# Patient Record
Sex: MALE | Race: WHITE
[De-identification: names, ages, dates, MRNs, and addresses within clinical notes are randomized per-mention and may not be internally consistent; named-entity substitution may affect disease eponyms.]

---

## 2018-02-05 ENCOUNTER — HOSPITAL ENCOUNTER (EMERGENCY)
Dept: HOSPITAL 17 - NEPD | Age: 33
Discharge: HOME | End: 2018-02-05
Payer: COMMERCIAL

## 2018-02-05 VITALS
TEMPERATURE: 98 F | SYSTOLIC BLOOD PRESSURE: 136 MMHG | HEART RATE: 56 BPM | RESPIRATION RATE: 16 BRPM | OXYGEN SATURATION: 99 % | DIASTOLIC BLOOD PRESSURE: 88 MMHG

## 2018-02-05 DIAGNOSIS — S81.852D: ICD-10-CM

## 2018-02-05 DIAGNOSIS — L02.416: Primary | ICD-10-CM

## 2018-02-05 DIAGNOSIS — L03.116: ICD-10-CM
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
## 2018-02-05 LAB
BASOPHILS # BLD AUTO: 0 TH/MM3 (ref 0–0.2)
BASOPHILS NFR BLD: 0.6 % (ref 0–2)
BUN SERPL-MCNC: 13 MG/DL (ref 7–18)
CALCIUM SERPL-MCNC: 9.7 MG/DL (ref 8.5–10.1)
CHLORIDE SERPL-SCNC: 106 MEQ/L (ref 98–107)
CREAT SERPL-MCNC: 0.94 MG/DL (ref 0.6–1.3)
EOSINOPHIL # BLD: 0.2 TH/MM3 (ref 0–0.4)
EOSINOPHIL NFR BLD: 2.5 % (ref 0–4)
ERYTHROCYTE [DISTWIDTH] IN BLOOD BY AUTOMATED COUNT: 13.2 % (ref 11.6–17.2)
GFR SERPLBLD BASED ON 1.73 SQ M-ARVRAT: 92 ML/MIN (ref 89–?)
GLUCOSE SERPL-MCNC: 88 MG/DL (ref 74–106)
HCO3 BLD-SCNC: 24.1 MEQ/L (ref 21–32)
HCT VFR BLD CALC: 40.3 % (ref 39–51)
HGB BLD-MCNC: 14.1 GM/DL (ref 13–17)
LYMPHOCYTES # BLD AUTO: 2.9 TH/MM3 (ref 1–4.8)
LYMPHOCYTES NFR BLD AUTO: 38.6 % (ref 9–44)
MCH RBC QN AUTO: 31.2 PG (ref 27–34)
MCHC RBC AUTO-ENTMCNC: 35 % (ref 32–36)
MCV RBC AUTO: 89 FL (ref 80–100)
MONOCYTE #: 0.6 TH/MM3 (ref 0–0.9)
MONOCYTES NFR BLD: 7.9 % (ref 0–8)
NEUTROPHILS # BLD AUTO: 3.7 TH/MM3 (ref 1.8–7.7)
NEUTROPHILS NFR BLD AUTO: 50.4 % (ref 16–70)
PLATELET # BLD: 270 TH/MM3 (ref 150–450)
PMV BLD AUTO: 8.5 FL (ref 7–11)
RBC # BLD AUTO: 4.53 MIL/MM3 (ref 4.5–5.9)
SODIUM SERPL-SCNC: 138 MEQ/L (ref 136–145)
WBC # BLD AUTO: 7.4 TH/MM3 (ref 4–11)

## 2018-02-05 PROCEDURE — 80048 BASIC METABOLIC PNL TOTAL CA: CPT

## 2018-02-05 PROCEDURE — 87205 SMEAR GRAM STAIN: CPT

## 2018-02-05 PROCEDURE — 85652 RBC SED RATE AUTOMATED: CPT

## 2018-02-05 PROCEDURE — 99284 EMERGENCY DEPT VISIT MOD MDM: CPT

## 2018-02-05 PROCEDURE — 85025 COMPLETE CBC W/AUTO DIFF WBC: CPT

## 2018-02-05 PROCEDURE — 73590 X-RAY EXAM OF LOWER LEG: CPT

## 2018-02-05 PROCEDURE — 87070 CULTURE OTHR SPECIMN AEROBIC: CPT

## 2018-02-05 NOTE — RADRPT
EXAM DATE/TIME:  02/05/2018 19:40 

 

[facility] COMPARISON:     

No previous studies available for comparison.

 

                     

INDICATIONS :     

Left tibia cat bite.

                     

 

MEDICAL HISTORY :     

None.          

 

SURGICAL HISTORY :     

None.   

 

ENCOUNTER:     

Initial                                        

 

ACUITY:     

3 weeks      

 

PAIN SCORE:     

3/10

 

LOCATION:     

Left middle lateral tibia.

 

FINDINGS:     

Two view examination of the left tibia demonstrates no evidence of fracture or dislocation.  Bony min
eralization is normal.  The soft tissue structures are intact.

 

CONCLUSION:     

Normal examination for a patient of this age.  

 

 

 

 Denilson Harrell MD on February 05, 2018 at 19:50           

Board Certified Radiologist.

 This report was verified electronically.

## 2018-02-05 NOTE — PD
HPI


Chief Complaint:  Bite or Sting


Time Seen by Provider:  19:07


Travel History


International Travel<30 days:  No


Contact w/Intl Traveler<30days:  No


Traveled to known affect area:  No





History of Present Illness


HPI


Patient is a 33-year-old male presenting to emergency department for evaluation 

of a cat bite.  Patient injury occurred on 1/18/18.  He went to his primary 

doctor was prescribed 10 days of doxycycline and clindamycin which she 

completed.  He reports that he's had continued oozing from the site to his left 

shin.  He reports it is tender to touch and rates his pain a 3 out of 10.  The 

pain is exacerbated with touch.  It is alleviated at rest.  Symptom onset was 

gradual.  He denies any fever, chills, nausea, vomiting, or warmth or 

increasing redness.  He was advised by his primary doctor to come to the 

emergency department to be evaluated if it was not healed.  Patient has been 

using peroxide to clean the area.  He was bitten by his own cat which is up-to-

date with immunizations.





PFSH


Past Medical History


Cancer:  No


High Cholesterol:  Yes


Diabetes:  No


Diminished Hearing:  No


Gastrointestinal Disorders:  Yes (asymptomatic gallstone, suspected peptic 

ulcer disease)


Hepatitis:  No


Hiatal Hernia:  No


Immunizations Current:  Yes


Thyroid Disease:  No





Past Surgical History


Abdominal Surgery:  Yes (RT INGUINAL HERNIA REPAIRED AT AGE 6)


Cardiac Surgery:  No


Cholecystectomy:  Yes


Ear Surgery:  No


Endocrine Surgery:  No


Eye Surgery:  No


Genitourinary Surgery:  No


Gynecologic Surgery:  No


Oral Surgery:  No


Pacemaker:  No


Thoracic Surgery:  No


Other Surgery:  Yes (RT INGUINAL HERNIA REPAIR IN CHILDHOOD)





Social History


Alcohol Use:  Yes (SOCIALLY)


Tobacco Use:  No


Substance Use:  No





Allergies-Medications


(Allergen,Severity, Reaction):  


Coded Allergies:  


     penicillin G (Unverified  Allergy, Intermediate, HIVES, 2/5/18)


 PT STATES ANAPHYLAXIS--THROAT SWELLING


     codeine (Unverified  Adverse Reaction, Severe, VOMIT & SOB, 2/5/18)


Reported Meds & Prescriptions





Reported Meds & Active Scripts


Active








Review of Systems


Except as stated in HPI:  all other systems reviewed are Neg


Skin:  Positive Change in Pigmentation, Positive Lesions





Physical Exam


Narrative


GENERAL: Well-developed, well-nourished, alert  male.  Resting in no 

acute distress.


SKIN: Warm and dry.  1 mm ulceration to the left anterior shin with mild 

surrounding induration which is purple in color.  Minimal Serosanguineous 

discharge noted.  No foul odor.


HEAD: Atraumatic. Normocephalic. 


EYES: Pupils equal and round. No scleral icterus. No injection or drainage. 


ENT: No nasal bleeding or discharge.  Mucous membranes pink and moist.


NECK: Trachea midline. No JVD. 


CARDIOVASCULAR: Regular rate and rhythm.  


RESPIRATORY: No accessory muscle use. Clear to auscultation. Breath sounds 

equal bilaterally. 


GASTROINTESTINAL: Abdomen soft, non-tender, nondistended. Hepatic and splenic 

margins not palpable. 


MUSCULOSKELETAL: Extremities without clubbing, cyanosis, or edema. No obvious 

deformities. 


NEUROLOGICAL: Awake and alert. No obvious cranial nerve deficits.  Motor 

grossly within normal limits. Five out of 5 muscle strength in the arms and 

legs.  Normal speech.


PSYCHIATRIC: Appropriate mood and affect; insight and judgment normal.





Data


Data


Last Documented VS





Vital Signs








  Date Time  Temp Pulse Resp B/P (MAP) Pulse Ox O2 Delivery O2 Flow Rate FiO2


 


2/5/18 17:15 98.0 56 16 136/88 (104) 99   








Orders





 Orders


Complete Blood Count With Diff (2/5/18 17:41)


Basic Metabolic Panel (Bmp) (2/5/18 17:41)


W[place]gren Sedimentation Rate (2/5/18 17:41)


Tibia/Fibula (Ap/Lat) (2/5/18 )


Wound Culture And Gram Stain (2/5/18 19:24)





Labs





Laboratory Tests








Test


  2/5/18


18:45


 


White Blood Count 7.4 TH/MM3 


 


Red Blood Count 4.53 MIL/MM3 


 


Hemoglobin 14.1 GM/DL 


 


Hematocrit 40.3 % 


 


Mean Corpuscular Volume 89.0 FL 


 


Mean Corpuscular Hemoglobin 31.2 PG 


 


Mean Corpuscular Hemoglobin


Concent 35.0 % 


 


 


Red Cell Distribution Width 13.2 % 


 


Platelet Count 270 TH/MM3 


 


Mean Platelet Volume 8.5 FL 


 


Neutrophils (%) (Auto) 50.4 % 


 


Lymphocytes (%) (Auto) 38.6 % 


 


Monocytes (%) (Auto) 7.9 % 


 


Eosinophils (%) (Auto) 2.5 % 


 


Basophils (%) (Auto) 0.6 % 


 


Neutrophils # (Auto) 3.7 TH/MM3 


 


Lymphocytes # (Auto) 2.9 TH/MM3 


 


Monocytes # (Auto) 0.6 TH/MM3 


 


Eosinophils # (Auto) 0.2 TH/MM3 


 


Basophils # (Auto) 0.0 TH/MM3 


 


CBC Comment DIFF FINAL 


 


Differential Comment  


 


Erythrocyte Sedimentation Rate 11 mm/hr 


 


Blood Urea Nitrogen 13 MG/DL 


 


Creatinine 0.94 MG/DL 


 


Random Glucose 88 MG/DL 


 


Calcium Level 9.7 MG/DL 


 


Sodium Level 138 MEQ/L 


 


Potassium Level 3.7 MEQ/L 


 


Chloride Level 106 MEQ/L 


 


Carbon Dioxide Level 24.1 MEQ/L 


 


Anion Gap 8 MEQ/L 


 


Estimat Glomerular Filtration


Rate 92 ML/MIN 


 











MDM


Medical Decision Making


Medical Screen Exam Complete:  Yes


Emergency Medical Condition:  Yes


Interpretation(s)





Last Impressions








Tibia/Fibula X-Ray 2/5/18 0000 Signed





Impressions: 





 Service Date/Time:  Monday, February 5, 2018 19:40 - CONCLUSION:  Normal 





 examination for a patient of this age.       Denilson Harrell MD 








Laboratory Tests








Test


  2/5/18


18:45


 


White Blood Count 7.4 TH/MM3 


 


Red Blood Count 4.53 MIL/MM3 


 


Hemoglobin 14.1 GM/DL 


 


Hematocrit 40.3 % 


 


Mean Corpuscular Volume 89.0 FL 


 


Mean Corpuscular Hemoglobin 31.2 PG 


 


Mean Corpuscular Hemoglobin


Concent 35.0 % 


 


 


Red Cell Distribution Width 13.2 % 


 


Platelet Count 270 TH/MM3 


 


Mean Platelet Volume 8.5 FL 


 


Neutrophils (%) (Auto) 50.4 % 


 


Lymphocytes (%) (Auto) 38.6 % 


 


Monocytes (%) (Auto) 7.9 % 


 


Eosinophils (%) (Auto) 2.5 % 


 


Basophils (%) (Auto) 0.6 % 


 


Neutrophils # (Auto) 3.7 TH/MM3 


 


Lymphocytes # (Auto) 2.9 TH/MM3 


 


Monocytes # (Auto) 0.6 TH/MM3 


 


Eosinophils # (Auto) 0.2 TH/MM3 


 


Basophils # (Auto) 0.0 TH/MM3 


 


CBC Comment DIFF FINAL 


 


Differential Comment  


 


Erythrocyte Sedimentation Rate 11 mm/hr 


 


Blood Urea Nitrogen 13 MG/DL 


 


Creatinine 0.94 MG/DL 


 


Random Glucose 88 MG/DL 


 


Calcium Level 9.7 MG/DL 


 


Sodium Level 138 MEQ/L 


 


Potassium Level 3.7 MEQ/L 


 


Chloride Level 106 MEQ/L 


 


Carbon Dioxide Level 24.1 MEQ/L 


 


Anion Gap 8 MEQ/L 


 


Estimat Glomerular Filtration


Rate 92 ML/MIN 


 








Vital Signs








  Date Time  Temp Pulse Resp B/P (MAP) Pulse Ox O2 Delivery O2 Flow Rate FiO2


 


2/5/18 17:15 98.0 56 16 136/88 (104) 99   








Differential Diagnosis


Osteomyelitis versus cellulitis versus abscess versus metabolic abnormality 

versus other


Narrative Course


Patient presented for reevaluation of a Bite that occurred for 2 weeks ago.  He 

was compliant with previously prescribed antibiotics.  He is vital signs are 

stable, he is well-appearing.  Labs reviewed, no acute findings identified.  

Wound Culture obtained and is pending.  X-ray of the left tibia/fibula is 

negative for bony infarct.  At this point patient will be placed on Bactrim, he 

was strongly advised to stop using peroxide to clean the wound.  Also be given 

a prescription for mupirocin ointment.  He was advised to follow-up with his 

primary doctor.  He was encouraged to return to emergency department for any 

new or worsening symptoms.  Patient verbalized understanding of instructions.  

Patient is stable for discharge.





Diagnosis





 Primary Impression:  


 Cat bite of left lower leg


 Qualified Codes:  S81.852D - Open bite, left lower leg, subsequent encounter; 

W55.01XD - Bitten by cat, subsequent encounter


 Additional Impression:  


 Cellulitis and abscess of left leg


Referrals:  


Primary Care Physician


2 days


Patient Instructions:  Acute Wound Care (GEN), General Instructions





***Additional Instructions:  


Follow-up with your primary doctor in 1-2 days


Do not use peroxide to clean wound, this will debride new tissue prolonging 

healing process


Apply mupirocin ointment twice daily, keep wound covered with nonocclusive 

dressing


Complete full course of antibiotics as prescribed, wound cultures pending 

should your antibiotics need to be changed will be notified.


Return to emergency department for any new or worsening symptoms


***Med/Other Pt SpecificInfo:  Prescription(s) given


Scripts


Mupirocin Topical (Mupirocin Topical) 2 % Oint


1 APPLIC TOPICAL BID for Mgmt Bacterial Infection, #22 GM 0 Refills


   Prov: Rosalie Spencer         2/5/18 


Sulfamethoxazole-Trimethoprim (Bactrim DS) 800-160 Mg Tab


1 TAB PO BID for Infection, #20 TAB 0 Refills


   Prov: Rosalie Spencer         2/5/18


Disposition:  01 DISCHARGE HOME


Condition:  Stable











Rosalie Spencer Feb 5, 2018 20:14